# Patient Record
Sex: MALE | Race: WHITE | NOT HISPANIC OR LATINO
[De-identification: names, ages, dates, MRNs, and addresses within clinical notes are randomized per-mention and may not be internally consistent; named-entity substitution may affect disease eponyms.]

---

## 2018-11-26 ENCOUNTER — IMAGING SERVICES (OUTPATIENT)
Dept: OTHER | Age: 48
End: 2018-11-26

## 2018-11-26 ENCOUNTER — CHARTING TRANS (OUTPATIENT)
Dept: URGENT CARE | Age: 48
End: 2018-11-26

## 2018-11-26 ASSESSMENT — PAIN SCALES - GENERAL: PAINLEVEL_OUTOF10: 2

## 2019-01-10 ENCOUNTER — OFFICE VISIT (OUTPATIENT)
Dept: INTERNAL MEDICINE | Age: 49
End: 2019-01-10

## 2019-01-10 ENCOUNTER — APPOINTMENT (OUTPATIENT)
Dept: LAB | Age: 49
End: 2019-01-10

## 2019-01-10 VITALS
TEMPERATURE: 98.2 F | WEIGHT: 149 LBS | BODY MASS INDEX: 20.86 KG/M2 | SYSTOLIC BLOOD PRESSURE: 126 MMHG | HEIGHT: 71 IN | HEART RATE: 112 BPM | DIASTOLIC BLOOD PRESSURE: 82 MMHG

## 2019-01-10 DIAGNOSIS — R00.0 TACHYCARDIA: ICD-10-CM

## 2019-01-10 DIAGNOSIS — I44.4 LEFT ANTERIOR FASCICULAR HEMIBLOCK: ICD-10-CM

## 2019-01-10 DIAGNOSIS — R07.89 OTHER CHEST PAIN: Primary | ICD-10-CM

## 2019-01-10 PROCEDURE — 99203 OFFICE O/P NEW LOW 30 MIN: CPT | Performed by: INTERNAL MEDICINE

## 2019-01-22 ENCOUNTER — ANCILLARY PROCEDURE (OUTPATIENT)
Dept: GENERAL RADIOLOGY | Age: 49
End: 2019-01-22
Attending: INTERNAL MEDICINE

## 2019-01-22 ENCOUNTER — ANCILLARY PROCEDURE (OUTPATIENT)
Dept: CARDIOLOGY | Age: 49
End: 2019-01-22
Attending: INTERNAL MEDICINE

## 2019-01-22 DIAGNOSIS — I44.4 LEFT ANTERIOR FASCICULAR HEMIBLOCK: ICD-10-CM

## 2019-01-22 DIAGNOSIS — R07.89 OTHER CHEST PAIN: ICD-10-CM

## 2019-01-22 PROCEDURE — 78452 HT MUSCLE IMAGE SPECT MULT: CPT | Performed by: INTERNAL MEDICINE

## 2019-01-22 PROCEDURE — A9500 TC99M SESTAMIBI: HCPCS | Performed by: INTERNAL MEDICINE

## 2019-01-22 PROCEDURE — 93015 CV STRESS TEST SUPVJ I&R: CPT | Performed by: INTERNAL MEDICINE

## 2019-01-22 RX ORDER — TETRAKIS(2-METHOXYISOBUTYLISOCYANIDE)COPPER(I) TETRAFLUOROBORATE 1 MG/ML
24 INJECTION, POWDER, LYOPHILIZED, FOR SOLUTION INTRAVENOUS ONCE
Status: COMPLETED | OUTPATIENT
Start: 2019-01-22 | End: 2019-01-22

## 2019-01-22 RX ORDER — TETRAKIS(2-METHOXYISOBUTYLISOCYANIDE)COPPER(I) TETRAFLUOROBORATE 1 MG/ML
8 INJECTION, POWDER, LYOPHILIZED, FOR SOLUTION INTRAVENOUS ONCE
Status: COMPLETED | OUTPATIENT
Start: 2019-01-22 | End: 2019-01-22

## 2019-01-22 RX ADMIN — TETRAKIS(2-METHOXYISOBUTYLISOCYANIDE)COPPER(I) TETRAFLUOROBORATE 26.3 MILLICURIE: 1 INJECTION, POWDER, LYOPHILIZED, FOR SOLUTION INTRAVENOUS at 10:47

## 2019-01-22 RX ADMIN — TETRAKIS(2-METHOXYISOBUTYLISOCYANIDE)COPPER(I) TETRAFLUOROBORATE 8.8 MILLICURIE: 1 INJECTION, POWDER, LYOPHILIZED, FOR SOLUTION INTRAVENOUS at 09:15

## 2019-01-23 ENCOUNTER — TELEPHONE (OUTPATIENT)
Dept: CARDIOLOGY | Age: 49
End: 2019-01-23

## 2019-01-30 ENCOUNTER — TELEPHONE (OUTPATIENT)
Dept: INTERNAL MEDICINE | Age: 49
End: 2019-01-30

## 2019-02-07 ENCOUNTER — IMAGING SERVICES (OUTPATIENT)
Dept: GENERAL RADIOLOGY | Age: 49
End: 2019-02-07
Attending: INTERNAL MEDICINE

## 2019-02-07 ENCOUNTER — OFFICE VISIT (OUTPATIENT)
Dept: INTERNAL MEDICINE | Age: 49
End: 2019-02-07

## 2019-02-07 ENCOUNTER — LAB SERVICES (OUTPATIENT)
Dept: LAB | Age: 49
End: 2019-02-07

## 2019-02-07 VITALS
BODY MASS INDEX: 20.86 KG/M2 | SYSTOLIC BLOOD PRESSURE: 128 MMHG | HEART RATE: 118 BPM | WEIGHT: 149 LBS | HEIGHT: 71 IN | DIASTOLIC BLOOD PRESSURE: 84 MMHG | TEMPERATURE: 98.3 F

## 2019-02-07 DIAGNOSIS — N20.0 NEPHROLITHIASIS: ICD-10-CM

## 2019-02-07 DIAGNOSIS — R20.2 PARESTHESIA OF LEFT ARM: Primary | ICD-10-CM

## 2019-02-07 DIAGNOSIS — R20.2 PARESTHESIA OF LEFT ARM: ICD-10-CM

## 2019-02-07 DIAGNOSIS — R07.89 CHEST DISCOMFORT: ICD-10-CM

## 2019-02-07 DIAGNOSIS — R10.12 ABDOMINAL PAIN, LUQ (LEFT UPPER QUADRANT): ICD-10-CM

## 2019-02-07 LAB
BILIRUBIN URINE: NEGATIVE
BLOOD URINE: NEGATIVE
CLARITY: CLEAR
COLOR: YELLOW
GLUCOSE QUALITATIVE U: NEGATIVE
KETONES, URINE: 15
LEUKOCYTE ESTERASE URINE: NEGATIVE
NITRITE URINE: NEGATIVE
PH URINE: 6 (ref 5–7)
SPECIFIC GRAVITY URINE: 1.01 (ref 1–1.03)
URINE PROTEIN, QUAL (DIPSTICK): NEGATIVE
UROBILINOGEN URINE: 1

## 2019-02-07 PROCEDURE — 81003 URINALYSIS AUTO W/O SCOPE: CPT | Performed by: INTERNAL MEDICINE

## 2019-02-07 PROCEDURE — 99214 OFFICE O/P EST MOD 30 MIN: CPT | Performed by: INTERNAL MEDICINE

## 2019-02-07 PROCEDURE — 72050 X-RAY EXAM NECK SPINE 4/5VWS: CPT | Performed by: RADIOLOGY

## 2019-02-08 ENCOUNTER — TELEPHONE (OUTPATIENT)
Dept: INTERNAL MEDICINE | Age: 49
End: 2019-02-08

## 2019-02-08 DIAGNOSIS — R20.2 TINGLING SENSATION: Primary | ICD-10-CM

## 2019-02-11 ENCOUNTER — TELEPHONE (OUTPATIENT)
Dept: FAMILY MEDICINE | Age: 49
End: 2019-02-11

## 2019-03-06 VITALS
DIASTOLIC BLOOD PRESSURE: 100 MMHG | SYSTOLIC BLOOD PRESSURE: 150 MMHG | HEART RATE: 107 BPM | RESPIRATION RATE: 16 BRPM | OXYGEN SATURATION: 97 % | TEMPERATURE: 98.2 F

## 2021-08-23 ENCOUNTER — ANESTHESIA EVENT (OUTPATIENT)
Dept: SURGERY | Facility: HOSPITAL | Age: 51
DRG: 661 | End: 2021-08-23
Payer: COMMERCIAL

## 2021-08-23 ENCOUNTER — ANESTHESIA (OUTPATIENT)
Dept: SURGERY | Facility: HOSPITAL | Age: 51
DRG: 661 | End: 2021-08-23
Payer: COMMERCIAL

## 2021-08-23 ENCOUNTER — APPOINTMENT (OUTPATIENT)
Dept: CT IMAGING | Facility: HOSPITAL | Age: 51
DRG: 661 | End: 2021-08-23
Attending: PHYSICIAN ASSISTANT
Payer: COMMERCIAL

## 2021-08-23 PROBLEM — R73.9 HYPERGLYCEMIA: Status: ACTIVE | Noted: 2021-08-23

## 2021-08-23 PROBLEM — R00.0 TACHYCARDIA: Status: ACTIVE | Noted: 2021-08-23

## 2021-08-23 PROBLEM — D69.6 THROMBOCYTOPENIA (HCC): Status: ACTIVE | Noted: 2021-08-23

## 2021-08-23 PROBLEM — E87.6 HYPOKALEMIA: Status: ACTIVE | Noted: 2021-08-23

## 2021-08-23 PROBLEM — K70.31 ASCITES DUE TO ALCOHOLIC CIRRHOSIS (HCC): Status: ACTIVE | Noted: 2021-08-23

## 2021-08-23 PROBLEM — N20.1 URETERAL STONE: Status: ACTIVE | Noted: 2021-08-23

## 2021-08-23 PROCEDURE — 74177 CT ABD & PELVIS W/CONTRAST: CPT | Performed by: PHYSICIAN ASSISTANT

## 2021-08-23 RX ORDER — LIDOCAINE HYDROCHLORIDE 10 MG/ML
INJECTION, SOLUTION EPIDURAL; INFILTRATION; INTRACAUDAL; PERINEURAL AS NEEDED
Status: DISCONTINUED | OUTPATIENT
Start: 2021-08-23 | End: 2021-08-23 | Stop reason: SURG

## 2021-08-23 RX ORDER — NEOSTIGMINE METHYLSULFATE 1 MG/ML
INJECTION INTRAVENOUS AS NEEDED
Status: DISCONTINUED | OUTPATIENT
Start: 2021-08-23 | End: 2021-08-23 | Stop reason: SURG

## 2021-08-23 RX ORDER — ROCURONIUM BROMIDE 10 MG/ML
INJECTION, SOLUTION INTRAVENOUS AS NEEDED
Status: DISCONTINUED | OUTPATIENT
Start: 2021-08-23 | End: 2021-08-23 | Stop reason: SURG

## 2021-08-23 RX ORDER — DEXAMETHASONE SODIUM PHOSPHATE 4 MG/ML
VIAL (ML) INJECTION AS NEEDED
Status: DISCONTINUED | OUTPATIENT
Start: 2021-08-23 | End: 2021-08-23 | Stop reason: SURG

## 2021-08-23 RX ORDER — SODIUM CHLORIDE 9 MG/ML
INJECTION, SOLUTION INTRAVENOUS CONTINUOUS PRN
Status: DISCONTINUED | OUTPATIENT
Start: 2021-08-23 | End: 2021-08-23 | Stop reason: SURG

## 2021-08-23 RX ORDER — ONDANSETRON 2 MG/ML
INJECTION INTRAMUSCULAR; INTRAVENOUS AS NEEDED
Status: DISCONTINUED | OUTPATIENT
Start: 2021-08-23 | End: 2021-08-23 | Stop reason: SURG

## 2021-08-23 RX ORDER — GLYCOPYRROLATE 0.2 MG/ML
INJECTION, SOLUTION INTRAMUSCULAR; INTRAVENOUS AS NEEDED
Status: DISCONTINUED | OUTPATIENT
Start: 2021-08-23 | End: 2021-08-23 | Stop reason: SURG

## 2021-08-23 RX ADMIN — LIDOCAINE HYDROCHLORIDE 50 MG: 10 INJECTION, SOLUTION EPIDURAL; INFILTRATION; INTRACAUDAL; PERINEURAL at 21:57:00

## 2021-08-23 RX ADMIN — ROCURONIUM BROMIDE 40 MG: 10 INJECTION, SOLUTION INTRAVENOUS at 21:57:00

## 2021-08-23 RX ADMIN — GLYCOPYRROLATE 0.4 MG: 0.2 INJECTION, SOLUTION INTRAMUSCULAR; INTRAVENOUS at 22:26:00

## 2021-08-23 RX ADMIN — ONDANSETRON 4 MG: 2 INJECTION INTRAMUSCULAR; INTRAVENOUS at 22:26:00

## 2021-08-23 RX ADMIN — DEXAMETHASONE SODIUM PHOSPHATE 4 MG: 4 MG/ML VIAL (ML) INJECTION at 22:01:00

## 2021-08-23 RX ADMIN — NEOSTIGMINE METHYLSULFATE 3 MG: 1 INJECTION INTRAVENOUS at 22:26:00

## 2021-08-23 RX ADMIN — SODIUM CHLORIDE: 9 INJECTION, SOLUTION INTRAVENOUS at 21:52:00

## 2021-08-23 NOTE — ED QUICK NOTES
Patient alert and oriented x3. No respiratory distress noted. Skin pink, warm, and dry. Patient presents with hematuria for 3 days. Patient also reports right suprapubic pain 3 days ago that has since resolved. Denies fever, N/V/D.   Patient has Hx o

## 2021-08-23 NOTE — ED PROVIDER NOTES
Patient Seen in: [facility] Emergency Department      History   Patient presents with:  Abdomen/Flank Pain  Urinary Symptoms    Stated Complaint: hemoturia, abdomen pain, hx kidney stones    HPI/Subjective:   HPI    Pleasant 52-year-old gentleman. bilaterally  Cardio: Regular rate and rhythm, normal S1-S2, no murmur appreciable  Extremities: Equal +1 pitting edema bilaterally. Dorsal pedis pulses appreciable.   No jaundice  Skin: No sign of trauma, Skin warm and dry, no induration or sign of infecti DIFFERENTIAL[[id number]]          Abnormal            Final result                 Please view results for these tests on the individual orders.    LACTIC ACID, PLASMA   RAINBOW DRAW LAVENDER   RAINBOW DRAW LIGHT GREEN   RAINBOW DRAW GOLD   RAINBOW DRAW BLUE including the appendix. Small bowel wall thickening. Moderate to large amount of ascites. Within the left upper quadrant peripherally is mesenteric infiltration and nodularity, peritoneal carcinomatosis cannot be excluded.   ABDOMINAL WALL:  No mass or h elevation of AST with a level of 80. Alkaline phosphatase of 229. Potassium of 3.3. Lipase within normal limits    PT of 17.2. INR of 1.37    CONCLUSION:  1. Right distal ureteral 4 mm obstructing calcification as detailed above.  2. Nonobstructing left

## 2021-08-24 NOTE — BH PROGRESS NOTE
Samara Hatch  seen the pt for his etoh history and possible cd options for treatment. Catia Pulido stated the pt appears to be in denial about the severity of his drinking. He denies help and states he has learned his lesson.   Patient was bijal

## 2021-08-24 NOTE — ANESTHESIA PROCEDURE NOTES
Airway  Date/Time: 8/23/2021 9:57 PM  Urgency: elective      General Information and Staff    Patient location during procedure: OR  Anesthesiologist: Tavon Hamilton MD  Performed: anesthesiologist     Indications and Patient Condition  Indications for

## 2021-08-24 NOTE — PLAN OF CARE
Assumed care at 0730. Pt a/ox4, VSS, on RA. No c/o pain, n/v/d, SOB, dizziness/lightheadedness. Hematuria noted post procedure. Rocephin q24h. Tolerating diet. IVF running 0.9NS at 100mL/hr. Pt updated on POC. Will continue to monitor.        Problem: Fior

## 2021-08-24 NOTE — OPERATIVE REPORT
Wm 236 Patient Status:  Emergency    1970 MRN [id number]   Location [facility] Attending Marshall Anderson MD   Hosp Day # 0 PCP No primary care provider on file.         DATE OF inside the ureteral orifice. The stent was in good position when the guidewire was removed. The patient was awakened, and taken to the recovery area in good condition. Jacqueline Rider M.D.  8/23/2021  10:34 PM

## 2021-08-24 NOTE — PROGRESS NOTES
Pt doing well, very mild tremors  Is reconsidering staying for GI eval and etoh w/d  Has never gone through w/d before but has not gone more than a day w/o etoh in 25 years. Has some mild tremors nows  Will consult GI.  Consider paracentesis  Start CIWA pr

## 2021-08-24 NOTE — PROGRESS NOTES
[facility]  Urology Progress Note    Janette Murray Patient Status:  Inpatient    1970 MRN [id number]   [facility] 3NE-A Attending Carmen Beatty MD   Hosp Day # 1 PCP No primary care provider on file.      Subjective:  Janette Murray with DC today from urology standpoint. Above discussed with patient, nurse.     Devin Snow, [facility] Urology  8/24/2021  9:09 AM

## 2021-08-24 NOTE — CONSULTS
[facility]                       Gastroenterology [address] [facility] Gastroenterology    Victor Hugo Bang Patient Status:  Inpatient    1970 MRN [id number]   [facility] 3NE-A Attending Antonio Camarena MD   [facility] Day # 1 PCP No primar (COMPAZINE) injection 5 mg, 5 mg, Intravenous, Q8H PRN  [COMPLETED] potassium chloride (K-DUR M20) CR tab 40 mEq, 40 mEq, Oral, Once  LORazepam (ATIVAN) tab 1 mg, 1 mg, Oral, Q1H PRN   Or  LORazepam (ATIVAN) injection 1 mg, 1 mg, Intravenous, Q1H PRN   Or palpitations            Respiratory: No shortness of breath, asthma, copd, recurrent pneumonia            Hematologic: The patient reports no easy bruising, frequent gum bleeding or nose bleeding;   The patient has no history of known chronic anemia 08/23/2021    ALKPHO 229 08/23/2021    BILT 1.9 08/23/2021    AST 80 08/23/2021    ALT 23 08/23/2021    INR 1.37 08/23/2021    PTP 17.2 08/23/2021     08/23/2021     Recent Labs   Lab 08/23/21  1743 08/24/21  0634   *  --    BUN 7  --    CREA calcification with a smaller punctate calcification more superiorly.     ADRENALS:  No mass or enlargement.     AORTA/VASCULAR:  No aneurysm or dissection.     RETROPERITONEUM:  No mass or adenopathy.     BOWEL/MESENTERY:  Small hiatal hernia.  Normal shayla 2.7, INR 1.37, WBC 5.9, Hgb 14, plt 120. Will start cirrhosis work-up to r/o autoimmune, viral process vs EtOH, obtain diagnostic/therapeutic paracentesis, and start low dose diuretics.  Discussed need for EtOH abstinence and need for further outpatient wor spironolactone and lasix, low Na diet, etoh abstinence and substance abuse counseling, outpatient EGD, colonoscopy at age 49, liver doppler    Casey De Los Santos MD MD  [facility] Gastroenterology  8/24/2021  4:47 PM

## 2021-08-24 NOTE — ANESTHESIA PREPROCEDURE EVALUATION
PRE-OP EVALUATION    Patient Name: Purvi Gave    Admit Diagnosis: Ureteral stone [N20.1]  Tachycardia [R00.0]  Ascites due to alcoholic cirrhosis ([address] [L40.18]    Pre-op Diagnosis: Ureteral calculus, right [N20.1]    CYSTOSCOPY STENT INSERTION RIGHT 35.8 (H) 08/23/2021    MCHC 34.9 08/23/2021    RDW 14.7 08/23/2021    .0 (L) 08/23/2021     Lab Results   Component Value Date     08/23/2021    K 3.3 (L) 08/23/2021     08/23/2021    CO2 25.0 08/23/2021    BUN 7 08/23/2021    CREATSERUM

## 2021-08-24 NOTE — CONSULTS
[facility]  Report of Consultation    Tiffany Deluna Patient Status:  Emergency    1970 MRN [id number]   [facility] SURGERY Attending Dennis Gaines MD   Hosp Day # 0 PCP No primary care provider on file.      Impression and Plan: 99.1 °F (37.3 °C) (Temporal)   Resp 24   Wt 157 lb (71.2 kg)   SpO2 95%   General: Alert, orientated x3. Cooperative. No apparent distress. HEENT: Exam is unremarkable. Lungs: Clear to auscultation bilaterally. Cardiac: Regular rate and rhythm.  No mu

## 2021-08-24 NOTE — H&P
[facility] HOSPITAL[facility]  History and Physical     Corby Manzano Patient Status:  Inpatient    1970 MRN [id number]   [facility] 3NE-A Attending Gibson Skinner, [address] [facility]note 3[facility] Day # 1 PCP No primary care provider on file.      Chief Complaint: Carla Sosa +distension. Positive bowel sounds. No rebound, guarding or organomegaly. Neurologic: No focal neurological deficits. CNII-XII grossly intact. Musculoskeletal: Moves all extremities. Extremities: No edema or cyanosis. Integument: No rashes or lesions. MD  8/24/2021

## 2021-08-24 NOTE — PROGRESS NOTES
08/24/21 1032   Clinical Encounter Type   Visited With Health care provider   Patient's Supportive Strategies/Resources Patient finds community/spiritual support at [address] [facility]   Referral To Nurse  (Father Tate Hough from [facility]

## 2021-08-24 NOTE — CM/SW NOTE
MDO: For PCP  TERRA placed name of call call [facility] PCP on AVS.

## 2021-08-24 NOTE — PLAN OF CARE
PT RECEIVED FROM PACU A/O, 92% ON RA, AFEBRILE, DENIES PAIN, SCDS ON, VOIDING BLOODY URINE, STRAINED, REPLACED K PER PROTOCOL, IVF INFUSING, GIVEN SCHEDULED IBUPROFEN, LABS IN AM, INSTRUCTED PT ON POC    Problem: GENITOURINARY - ADULT  Goal: Absence of uri

## 2021-08-24 NOTE — ANESTHESIA POSTPROCEDURE EVALUATION
[address] [facility] Patient Status:  Emergency   Age/Gender 53year old male MRN [id number]   Location [address] [facility] Attending Janet Lee MD   Hosp Day # 0 PCP No primary care provider on file.        Anesthesia Po

## 2021-08-24 NOTE — IMAGING NOTE
Plan for US paracentesis 8/25/21 ay 9:30am  Instructions reviewed with Asif Fuller RN  Patient is Consent able  Hold lovenox in am 8/25 until after the procedure  Labs reviewed; no additional labs needed at this time  Clear liquids in am  Call radiology nursing

## 2021-08-25 ENCOUNTER — APPOINTMENT (OUTPATIENT)
Dept: ULTRASOUND IMAGING | Facility: HOSPITAL | Age: 51
DRG: 661 | End: 2021-08-25
Attending: NURSE PRACTITIONER
Payer: COMMERCIAL

## 2021-08-25 PROCEDURE — 93975 VASCULAR STUDY: CPT | Performed by: NURSE PRACTITIONER

## 2021-08-25 PROCEDURE — 49083 ABD PARACENTESIS W/IMAGING: CPT | Performed by: NURSE PRACTITIONER

## 2021-08-25 PROCEDURE — 76700 US EXAM ABDOM COMPLETE: CPT | Performed by: NURSE PRACTITIONER

## 2021-08-25 NOTE — PLAN OF CARE
NURSING DISCHARGE NOTE    Discharged Home via Ambulatory. Accompanied by Family member  Belongings Taken by patient/family. Patient was discharged home with new medications.  Discussed discharge instructions discussed with patient and father at beds

## 2021-08-25 NOTE — PLAN OF CARE
Pt A&OX4 Room Air  Resting in bed  Denies pain at this time  Hematuria Noted  Continue to strain Urine  Electrolyte protocol  CIWA protocol, pt Not actively detoxing  at this time  Seizure precaution maintained.   Q24 Rocephin   Plan paracentesis and ABD US

## 2021-08-25 NOTE — PROCEDURES
[facility]  Procedure Note    Tammideanna Smithyoko Patient Status:  Inpatient    1970 MRN [id number]   [facility] 3NE-A Attending Mariana Bermudez MD   Hosp Day # 2 PCP No primary care provider on file.      LOCATION: Right lower quadrant  F

## 2021-08-25 NOTE — PROGRESS NOTES
[facility]  Progress Note    Janette Murray Patient Status:  Inpatient    1970 MRN [id number]   [facility] 3NE-A Attending Carmen Beatty MD   Date 2021 PCP No primary care provider on file.      Subjective:  Janette Murray is colic      Impression:  Liver cirrhosis  Ascites high SAAG, no SBP  LE edema    Plan:  Low NA diet  Complete hepatitis w/u  Spironolactone 50 mg PO q day  Lasix 20 mg PO q day  Ok to discharge from GI stand point  Elective EGD as outpatient, follow GI clin

## 2021-08-25 NOTE — PLAN OF CARE
Problem: GENITOURINARY - ADULT  Goal: Absence of urinary retention  Description: INTERVENTIONS:  - Assess patient’s ability to void and empty bladder  - Monitor intake/output and perform bladder scan as needed  - Follow urinary retention protocol/allan

## 2021-08-26 ENCOUNTER — PATIENT OUTREACH (OUTPATIENT)
Dept: CASE MANAGEMENT | Age: 51
End: 2021-08-26

## 2021-08-26 DIAGNOSIS — Z02.9 ENCOUNTERS FOR UNSPECIFIED ADMINISTRATIVE PURPOSE: ICD-10-CM

## 2021-08-26 NOTE — DISCHARGE SUMMARY
[facility] HOSPITALIST  DISCHARGE SUMMARY     Juan Francisco Bowser Patient Status:  Inpatient    1970 MRN [id number]   [facility] 3NE-A Attending No att. providers found   [facility] Day # 2 PCP No primary care provider on file.      Date of Admission: 8 paracentesis    Incidental or significant findings and recommendations (brief descriptions):  • none    Lab/Test results pending at Discharge:   · none    Consultants:  • Urology, GI     Discharge Medication List:     Discharge Medications      START takin General: No acute distress. Respiratory: Clear to auscultation bilaterally. No wheezes. No rhonchi. Cardiovascular: S1, S2. Regular rate and rhythm. No murmurs, rubs or gallops. Abdomen: Soft, nontender, distended. Positive bowel sounds.  No rebound

## 2021-08-31 ENCOUNTER — PATIENT OUTREACH (OUTPATIENT)
Dept: CASE MANAGEMENT | Age: 51
End: 2021-08-31

## 2021-08-31 NOTE — PROGRESS NOTES
Pt calling to ask for assistance scheduling the following:     Follow up with Leila Hernandez MD  liver cirrhosis  [facility] [phone]  Office will not schedule without referral     Schedule an appointment wi

## 2021-09-03 ENCOUNTER — OFFICE VISIT (OUTPATIENT)
Dept: FAMILY MEDICINE CLINIC | Facility: CLINIC | Age: 51
End: 2021-09-03

## 2021-09-03 VITALS
HEIGHT: 69.69 IN | DIASTOLIC BLOOD PRESSURE: 60 MMHG | WEIGHT: 138.19 LBS | OXYGEN SATURATION: 99 % | TEMPERATURE: 98 F | SYSTOLIC BLOOD PRESSURE: 110 MMHG | HEART RATE: 91 BPM | RESPIRATION RATE: 18 BRPM | BODY MASS INDEX: 20.01 KG/M2

## 2021-09-03 DIAGNOSIS — R31.0 GROSS HEMATURIA: ICD-10-CM

## 2021-09-03 DIAGNOSIS — K70.31 ASCITES DUE TO ALCOHOLIC CIRRHOSIS (HCC): ICD-10-CM

## 2021-09-03 DIAGNOSIS — N20.1 URETERAL STONE: Primary | ICD-10-CM

## 2021-09-03 PROBLEM — F17.209 TOBACCO USE DISORDER, CONTINUOUS: Status: ACTIVE | Noted: 2021-09-03

## 2021-09-03 PROCEDURE — 3074F SYST BP LT 130 MM HG: CPT | Performed by: FAMILY MEDICINE

## 2021-09-03 PROCEDURE — 99203 OFFICE O/P NEW LOW 30 MIN: CPT | Performed by: FAMILY MEDICINE

## 2021-09-03 PROCEDURE — 3078F DIAST BP <80 MM HG: CPT | Performed by: FAMILY MEDICINE

## 2021-09-03 PROCEDURE — 3008F BODY MASS INDEX DOCD: CPT | Performed by: FAMILY MEDICINE

## 2021-09-03 RX ORDER — CALCIUM CARBONATE/VITAMIN D3 600 MG-10
1 TABLET ORAL DAILY
COMMUNITY
Start: 2021-08-25 | End: 2021-09-15 | Stop reason: ALTCHOICE

## 2021-09-04 PROBLEM — R00.0 TACHYCARDIA: Status: RESOLVED | Noted: 2021-08-23 | Resolved: 2021-09-04

## 2021-09-04 PROBLEM — R73.9 HYPERGLYCEMIA: Status: RESOLVED | Noted: 2021-08-23 | Resolved: 2021-09-04

## 2021-09-05 NOTE — PROGRESS NOTES
CHIEF COMPLAINT: Patient presents with:  Hospital F/U  Establish Care        HPI:     Harsh Mitchell is a 53year old male presents for hospital f-u. Maycol Iraj is a 52 yo recovering alcoholic with liver cirrhosis here to establish care p/w hospital f-u. Medication Sig Dispense Refill   • thiamine 100 MG Oral Tab Take 1 tablet (100 mg total) by mouth daily. 30 tablet 0   • furosemide 20 MG Oral Tab Take 1 tablet (20 mg total) by mouth daily.  30 tablet 0   • spironolactone 50 MG Oral Tab Take 1 tablet (50 Palpations: Abdomen is soft. There is no mass. Tenderness: There is no guarding. Musculoskeletal:      Cervical back: Normal range of motion and neck supple. Lymphadenopathy:      Cervical: No cervical adenopathy.    Neurological:      Mental Statu • Eosinophil Absolute 08/23/2021 0.08    • Basophil Absolute 08/23/2021 0.03    • Immature Granulocyte Abs* 08/23/2021 0.01    • Neutrophil % 08/23/2021 61.3    • Lymphocyte % 08/23/2021 26.2    • Monocyte % 08/23/2021 10.4    • Eosinophil % 08/23/2021 1 • Hepatitis B Surface Anti* 08/24/2021 Nonreactive     • Heptatitis B Surface Ab * 08/24/2021 <3.10    • Hepatitis A Virus Ab, To* 08/23/2021 Reactive *   • Hepatitis C Virus 08/23/2021 Nonreactive     • Glucose 08/24/2021 120*   • Sodium 08/24/2021 135* Fluid Color 08/25/2021 Yellow    • Body Fluid Clarity 08/25/2021 Clear    • WBC Peritoneal 08/25/2021 117    • RBC Peritoneal 08/25/2021 <3,000    • Body Fluid Culture Result 08/25/2021 No Growth 5 Days    • Body Fld Smear 08/25/2021 1+ Neutrophils seen 08/25/2021 2.0    • Neutrophils Peritoneal 08/25/2021 9    • Lymphocyte Peritoneal 08/25/2021 31    • Mono/Mac/Histio Peritone* 08/25/2021 57    • Eosinophils Peritoneal 08/25/2021 0    • Basophil Peritoneal 08/25/2021 0    • Mesothelial Peritoneal 08/25/2 cirrhosis ([address]     Ureteral stone     Pyelonephritis     Tobacco use disorder, continuous      Imaging & Referrals:  GASTRO - INTERNAL  UROLOGY - INTERNAL     9/4/2021  Audra Moore MD      Patient understands plan and follow-up.   Return in abo

## 2021-09-09 NOTE — PROGRESS NOTES
Several attempts made to reach the patient with no return call. Patient completed HFU on 9/3/2021. Closing encounter.

## 2021-09-18 ENCOUNTER — LAB ENCOUNTER (OUTPATIENT)
Dept: LAB | Facility: HOSPITAL | Age: 51
End: 2021-09-18
Attending: UROLOGY
Payer: COMMERCIAL

## 2021-09-18 DIAGNOSIS — N20.1 URETERAL CALCULUS: ICD-10-CM

## 2021-09-18 LAB
ALBUMIN SERPL-MCNC: 2.6 G/DL (ref 3.4–5)
ALBUMIN/GLOB SERPL: 0.5 {RATIO} (ref 1–2)
ALP LIVER SERPL-CCNC: 185 U/L
ALT SERPL-CCNC: 54 U/L
ANION GAP SERPL CALC-SCNC: 6 MMOL/L (ref 0–18)
AST SERPL-CCNC: 55 U/L (ref 15–37)
BILIRUB SERPL-MCNC: 1.4 MG/DL (ref 0.1–2)
BUN BLD-MCNC: 17 MG/DL (ref 7–18)
CALCIUM BLD-MCNC: 9.2 MG/DL (ref 8.5–10.1)
CHLORIDE SERPL-SCNC: 105 MMOL/L (ref 98–112)
CO2 SERPL-SCNC: 22 MMOL/L (ref 21–32)
CREAT BLD-MCNC: 0.84 MG/DL
GLOBULIN PLAS-MCNC: 5.2 G/DL (ref 2.8–4.4)
GLUCOSE BLD-MCNC: 96 MG/DL (ref 70–99)
INR BLD: 1.17 (ref 0.89–1.11)
OSMOLALITY SERPL CALC.SUM OF ELEC: 277 MOSM/KG (ref 275–295)
POTASSIUM SERPL-SCNC: 4.7 MMOL/L (ref 3.5–5.1)
PROT SERPL-MCNC: 7.8 G/DL (ref 6.4–8.2)
PROTHROMBIN TIME: 15.2 SECONDS (ref 12.2–14.5)
SODIUM SERPL-SCNC: 133 MMOL/L (ref 136–145)

## 2021-09-18 PROCEDURE — 80053 COMPREHEN METABOLIC PANEL: CPT

## 2021-09-18 PROCEDURE — 36415 COLL VENOUS BLD VENIPUNCTURE: CPT

## 2021-09-18 PROCEDURE — 85610 PROTHROMBIN TIME: CPT

## 2021-09-20 ENCOUNTER — ANESTHESIA EVENT (OUTPATIENT)
Dept: SURGERY | Facility: HOSPITAL | Age: 51
End: 2021-09-20
Payer: COMMERCIAL

## 2021-09-20 ENCOUNTER — APPOINTMENT (OUTPATIENT)
Dept: GENERAL RADIOLOGY | Facility: HOSPITAL | Age: 51
End: 2021-09-20
Attending: UROLOGY
Payer: COMMERCIAL

## 2021-09-20 ENCOUNTER — ANESTHESIA (OUTPATIENT)
Dept: SURGERY | Facility: HOSPITAL | Age: 51
End: 2021-09-20
Payer: COMMERCIAL

## 2021-09-20 ENCOUNTER — HOSPITAL ENCOUNTER (OUTPATIENT)
Facility: HOSPITAL | Age: 51
Setting detail: HOSPITAL OUTPATIENT SURGERY
Discharge: HOME | End: 2021-09-20
Attending: UROLOGY | Admitting: UROLOGY
Payer: COMMERCIAL

## 2021-09-20 VITALS
SYSTOLIC BLOOD PRESSURE: 115 MMHG | OXYGEN SATURATION: 100 % | RESPIRATION RATE: 16 BRPM | DIASTOLIC BLOOD PRESSURE: 70 MMHG | HEART RATE: 89 BPM | TEMPERATURE: 98 F | WEIGHT: 140.19 LBS | BODY MASS INDEX: 20.3 KG/M2 | HEIGHT: 69.6 IN

## 2021-09-20 DIAGNOSIS — N20.1 URETERAL CALCULUS: Primary | ICD-10-CM

## 2021-09-20 LAB — SARS-COV-2 RNA RESP QL NAA+PROBE: NOT DETECTED

## 2021-09-20 PROCEDURE — 0TC68ZZ EXTIRPATION OF MATTER FROM RIGHT URETER, VIA NATURAL OR ARTIFICIAL OPENING ENDOSCOPIC: ICD-10-PCS | Performed by: UROLOGY

## 2021-09-20 PROCEDURE — 88300 SURGICAL PATH GROSS: CPT | Performed by: UROLOGY

## 2021-09-20 PROCEDURE — 0TP98DZ REMOVAL OF INTRALUMINAL DEVICE FROM URETER, VIA NATURAL OR ARTIFICIAL OPENING ENDOSCOPIC: ICD-10-PCS | Performed by: UROLOGY

## 2021-09-20 PROCEDURE — 82365 CALCULUS SPECTROSCOPY: CPT | Performed by: UROLOGY

## 2021-09-20 RX ORDER — HYDROCODONE BITARTRATE AND ACETAMINOPHEN 5; 325 MG/1; MG/1
2 TABLET ORAL AS NEEDED
Status: DISCONTINUED | OUTPATIENT
Start: 2021-09-20 | End: 2021-09-20

## 2021-09-20 RX ORDER — SODIUM CHLORIDE, SODIUM LACTATE, POTASSIUM CHLORIDE, CALCIUM CHLORIDE 600; 310; 30; 20 MG/100ML; MG/100ML; MG/100ML; MG/100ML
INJECTION, SOLUTION INTRAVENOUS CONTINUOUS
Status: DISCONTINUED | OUTPATIENT
Start: 2021-09-20 | End: 2021-09-20

## 2021-09-20 RX ORDER — METOCLOPRAMIDE HYDROCHLORIDE 5 MG/ML
10 INJECTION INTRAMUSCULAR; INTRAVENOUS AS NEEDED
Status: DISCONTINUED | OUTPATIENT
Start: 2021-09-20 | End: 2021-09-20

## 2021-09-20 RX ORDER — NALOXONE HYDROCHLORIDE 0.4 MG/ML
80 INJECTION, SOLUTION INTRAMUSCULAR; INTRAVENOUS; SUBCUTANEOUS AS NEEDED
Status: DISCONTINUED | OUTPATIENT
Start: 2021-09-20 | End: 2021-09-20

## 2021-09-20 RX ORDER — HYDROMORPHONE HYDROCHLORIDE 1 MG/ML
0.4 INJECTION, SOLUTION INTRAMUSCULAR; INTRAVENOUS; SUBCUTANEOUS EVERY 5 MIN PRN
Status: DISCONTINUED | OUTPATIENT
Start: 2021-09-20 | End: 2021-09-20

## 2021-09-20 RX ORDER — CEFAZOLIN SODIUM/WATER 2 G/20 ML
2 SYRINGE (ML) INTRAVENOUS ONCE
Status: COMPLETED | OUTPATIENT
Start: 2021-09-20 | End: 2021-09-20

## 2021-09-20 RX ORDER — CEPHALEXIN 500 MG/1
500 CAPSULE ORAL 3 TIMES DAILY
Qty: 6 CAPSULE | Refills: 0 | Status: SHIPPED | OUTPATIENT
Start: 2021-09-20 | End: 2021-09-22

## 2021-09-20 RX ORDER — LIDOCAINE HYDROCHLORIDE 10 MG/ML
INJECTION, SOLUTION EPIDURAL; INFILTRATION; INTRACAUDAL; PERINEURAL AS NEEDED
Status: DISCONTINUED | OUTPATIENT
Start: 2021-09-20 | End: 2021-09-20 | Stop reason: SURG

## 2021-09-20 RX ORDER — ONDANSETRON 2 MG/ML
4 INJECTION INTRAMUSCULAR; INTRAVENOUS AS NEEDED
Status: DISCONTINUED | OUTPATIENT
Start: 2021-09-20 | End: 2021-09-20

## 2021-09-20 RX ORDER — METOCLOPRAMIDE HYDROCHLORIDE 5 MG/ML
INJECTION INTRAMUSCULAR; INTRAVENOUS AS NEEDED
Status: DISCONTINUED | OUTPATIENT
Start: 2021-09-20 | End: 2021-09-20 | Stop reason: SURG

## 2021-09-20 RX ORDER — DEXAMETHASONE SODIUM PHOSPHATE 4 MG/ML
VIAL (ML) INJECTION AS NEEDED
Status: DISCONTINUED | OUTPATIENT
Start: 2021-09-20 | End: 2021-09-20 | Stop reason: SURG

## 2021-09-20 RX ORDER — HYDROCODONE BITARTRATE AND ACETAMINOPHEN 5; 325 MG/1; MG/1
1 TABLET ORAL AS NEEDED
Status: DISCONTINUED | OUTPATIENT
Start: 2021-09-20 | End: 2021-09-20

## 2021-09-20 RX ORDER — PHENYLEPHRINE HCL 10 MG/ML
VIAL (ML) INJECTION AS NEEDED
Status: DISCONTINUED | OUTPATIENT
Start: 2021-09-20 | End: 2021-09-20 | Stop reason: SURG

## 2021-09-20 RX ADMIN — PHENYLEPHRINE HCL 25 MCG: 10 MG/ML VIAL (ML) INJECTION at 15:19:00

## 2021-09-20 RX ADMIN — LIDOCAINE HYDROCHLORIDE 50 MG: 10 INJECTION, SOLUTION EPIDURAL; INFILTRATION; INTRACAUDAL; PERINEURAL at 15:16:00

## 2021-09-20 RX ADMIN — SODIUM CHLORIDE, SODIUM LACTATE, POTASSIUM CHLORIDE, CALCIUM CHLORIDE: 600; 310; 30; 20 INJECTION, SOLUTION INTRAVENOUS at 16:03:00

## 2021-09-20 RX ADMIN — CEFAZOLIN SODIUM/WATER 2 G: 2 G/20 ML SYRINGE (ML) INTRAVENOUS at 15:23:00

## 2021-09-20 RX ADMIN — METOCLOPRAMIDE HYDROCHLORIDE 10 MG: 5 INJECTION INTRAMUSCULAR; INTRAVENOUS at 15:29:00

## 2021-09-20 RX ADMIN — DEXAMETHASONE SODIUM PHOSPHATE 4 MG: 4 MG/ML VIAL (ML) INJECTION at 15:29:00

## 2021-09-20 NOTE — H&P
[facility]  H&P    Isatudorothy Vivas Patient Status:  Hospital Outpatient Surgery    1970 MRN [id number]   [facility] SURGERY Attending Stella Hassan MD   [facility] Day # 0 PCP Patricio Meigs     Impression and Plan:  Right Never used    Substance and Sexual Activity      Alcohol use: Not Currently        Comment: 8-10 shots approx/day      Drug use: Never      Sexual activity: Not on file    Other Topics      Concerns:        Caffeine Concern: Not Asked        Exercise: Not (63.6 kg)   SpO2 97%   BMI 20.35 kg/m²   General: Alert, orientated x3. Cooperative. No apparent distress. HEENT: Exam is unremarkable. Lungs: Clear to auscultation bilaterally. Cardiac: Regular rate and rhythm. No murmur.   Abdomen:  Soft, non-disten

## 2021-09-20 NOTE — ANESTHESIA POSTPROCEDURE EVALUATION
[address] Patient Status:  Hospital Outpatient Surgery   Age/Gender 49year old male MRN [id number]   Location [address] [facility] Attending Lalita Perez MD   [facility] Day # 0 PCP Cedric Garcia

## 2021-09-20 NOTE — OPERATIVE REPORT
[facility] Patient Status:  Hospital Outpatient Surgery    1970 MRN [id number]   [facility] SURGERY Attending Carissa Barreto MD   [facility] Day # 0 PCP Audra [place]       DATE OF OPE to the recovery area in good condition.      Rikki Turpin MD  9/20/2021  3:56 PM

## 2021-09-20 NOTE — ANESTHESIA PROCEDURE NOTES
Airway  Date/Time: 9/20/2021 3:19 PM  Urgency: elective      General Information and Staff    Patient location during procedure: OR  Anesthesiologist: Nola Alfaro DO  Performed: anesthesiologist     Indications and Patient Condition  Indications fo

## 2021-09-20 NOTE — ANESTHESIA PREPROCEDURE EVALUATION
PRE-OP EVALUATION    Patient Name: Enoch Pearson    Admit Diagnosis: Ureteral calculus [N20.1]    Pre-op Diagnosis: Ureteral calculus [N20.1]    CYSTOSCOPY, RIGHT URETEROSCOPIC STONE EXTRACTION, POSSIBLE HOLMIUM LASER LITHOTRIPSY, RIGHT RETROGRADE PY Past Surgical History:   Procedure Laterality Date   • OTHER  08/2021    kidney stent placement   • OTHER Right     broken leg repair-no hardware     Social History    Tobacco Use      Smoking status: Light Tobacco Smoker      Smokeless tobacco

## 2021-09-24 LAB
CALCULI MASS: 11 MG
CALCULI NUMBER: 4

## 2021-10-01 ENCOUNTER — LAB ENCOUNTER (OUTPATIENT)
Dept: LAB | Age: 51
End: 2021-10-01
Attending: INTERNAL MEDICINE
Payer: COMMERCIAL

## 2021-10-01 DIAGNOSIS — K70.31 ALCOHOLIC CIRRHOSIS OF LIVER WITH ASCITES (HCC): ICD-10-CM

## 2021-10-01 PROCEDURE — 80053 COMPREHEN METABOLIC PANEL: CPT

## 2021-10-01 PROCEDURE — 36415 COLL VENOUS BLD VENIPUNCTURE: CPT

## 2021-10-04 PROBLEM — Z12.11 SPECIAL SCREENING FOR MALIGNANT NEOPLASMS, COLON: Status: ACTIVE | Noted: 2021-10-04

## 2022-05-17 ENCOUNTER — PATIENT OUTREACH (OUTPATIENT)
Dept: FAMILY MEDICINE CLINIC | Facility: CLINIC | Age: 52
End: 2022-05-17

## 2022-09-26 ENCOUNTER — HOSPITAL ENCOUNTER (OUTPATIENT)
Dept: CT IMAGING | Age: 52
Discharge: HOME | End: 2022-09-26
Attending: FAMILY MEDICINE

## 2022-09-26 ENCOUNTER — OFFICE VISIT (OUTPATIENT)
Dept: FAMILY MEDICINE CLINIC | Facility: CLINIC | Age: 52
End: 2022-09-26

## 2022-09-26 VITALS
SYSTOLIC BLOOD PRESSURE: 126 MMHG | DIASTOLIC BLOOD PRESSURE: 50 MMHG | TEMPERATURE: 99 F | BODY MASS INDEX: 21 KG/M2 | HEART RATE: 84 BPM | WEIGHT: 153.63 LBS | OXYGEN SATURATION: 97 % | RESPIRATION RATE: 18 BRPM

## 2022-09-26 DIAGNOSIS — F17.200 TOBACCO DEPENDENCE: ICD-10-CM

## 2022-09-26 DIAGNOSIS — R31.0 GROSS HEMATURIA: ICD-10-CM

## 2022-09-26 DIAGNOSIS — N20.0 NEPHROLITHIASIS: Primary | ICD-10-CM

## 2022-09-26 DIAGNOSIS — R31.0 GROSS HEMATURIA: Primary | ICD-10-CM

## 2022-09-26 PROBLEM — E87.6 HYPOKALEMIA: Status: RESOLVED | Noted: 2021-08-23 | Resolved: 2022-09-26

## 2022-09-26 PROBLEM — Z12.11 SPECIAL SCREENING FOR MALIGNANT NEOPLASMS, COLON: Status: RESOLVED | Noted: 2021-10-04 | Resolved: 2022-09-26

## 2022-09-26 LAB
APPEARANCE: CLEAR
GLUCOSE (URINE DIPSTICK): NEGATIVE MG/DL
LEUKOCYTES: NEGATIVE
MULTISTIX LOT#: ABNORMAL NUMERIC
NITRITE, URINE: NEGATIVE
PH, URINE: 6 (ref 4.5–8)
PROTEIN (URINE DIPSTICK): 100 MG/DL
SPECIFIC GRAVITY: 1.02 (ref 1–1.03)
UROBILINOGEN,SEMI-QN: 2 MG/DL (ref 0–1.9)

## 2022-09-26 PROCEDURE — 87086 URINE CULTURE/COLONY COUNT: CPT | Performed by: FAMILY MEDICINE

## 2022-09-26 PROCEDURE — 74176 CT ABD & PELVIS W/O CONTRAST: CPT | Performed by: FAMILY MEDICINE

## 2022-09-29 ENCOUNTER — PATIENT MESSAGE (OUTPATIENT)
Dept: FAMILY MEDICINE CLINIC | Facility: CLINIC | Age: 52
End: 2022-09-29

## 2022-09-30 NOTE — TELEPHONE ENCOUNTER
From: Geanie Osgood  To: Margi Escobar MD  Sent: 9/29/2022 11:44 PM CDT  Subject: Question regarding Urinalysis w/o scope    Hi, I don't see an referral for Dr. Alexandra Glez the urologist I saw last time. Are you able to set one up or is he not covered under the new insurance?

## 2022-10-27 ENCOUNTER — OFFICE VISIT (OUTPATIENT)
Dept: SURGERY | Facility: CLINIC | Age: 52
End: 2022-10-27
Payer: COMMERCIAL

## 2022-10-27 DIAGNOSIS — N20.0 NEPHROLITHIASIS: Primary | ICD-10-CM

## 2022-10-27 DIAGNOSIS — R82.90 URINE FINDING: ICD-10-CM

## 2022-10-27 LAB
GLUCOSE (URINE DIPSTICK): NEGATIVE MG/DL
KETONES (URINE DIPSTICK): NEGATIVE MG/DL
LEUKOCYTES: NEGATIVE
MULTISTIX LOT#: ABNORMAL NUMERIC
NITRITE, URINE: NEGATIVE
PH, URINE: 5.5 (ref 4.5–8)
PROTEIN (URINE DIPSTICK): 30 MG/DL
SPECIFIC GRAVITY: >=1.03 (ref 1–1.03)
UROBILINOGEN,SEMI-QN: 1 MG/DL (ref 0–1.9)

## 2022-10-27 PROCEDURE — 99204 OFFICE O/P NEW MOD 45 MIN: CPT | Performed by: SURGERY

## 2022-10-27 PROCEDURE — 81003 URINALYSIS AUTO W/O SCOPE: CPT | Performed by: SURGERY

## 2022-10-31 NOTE — PROGRESS NOTES
Radha Garza,    I have reviewed your urine test results. Tests show no significant abnormalities (no urine infection). No changes to the plan as we had previously discussed. Please let me know if you have any questions.     Thanks,  Bipin Hartley MD

## 2023-08-14 ENCOUNTER — TELEPHONE (OUTPATIENT)
Dept: FAMILY MEDICINE CLINIC | Facility: CLINIC | Age: 53
End: 2023-08-14

## 2023-08-14 NOTE — TELEPHONE ENCOUNTER
Pt tested positive for Covid today. Symptoms started Saturday afternoon. Symptoms include Fever Tmax 101.7 (goes down after Tylenol), runny nose, body aches. Advised on home supportive cares - Tylenol/ibuprofen for pain, Mucinex DM 12-hour ER for cough, honey for sore throat, nasal spray and humidifier for congestion, increased rest and hydration, pt verbalized understanding. Advised on CDC home isolation guidelines:  Stay home for five days. If you have no symptoms or your symptoms are resolving after five days, you can leave your house. Continue to wear a mask around others for five additional days. If you have a fever, continue to stay home until your fever resolves. Advised to seek immediate emergency medical attention, if severe SOB, chest pain, dizziness, altered mental status, or severe fatigue. Is pt a candidate for Paxlovid? Routed to provider for review and advice.

## 2023-08-14 NOTE — TELEPHONE ENCOUNTER
Recommend supportive care, given mild sx and has no chronic conditions that put him at risk for severe Covid. Addressed while on wife;s video visit. Thanks.

## 2024-04-21 ENCOUNTER — HOSPITAL ENCOUNTER (EMERGENCY)
Facility: HOSPITAL | Age: 54
Discharge: HOME | End: 2024-04-21
Attending: EMERGENCY MEDICINE
Payer: COMMERCIAL

## 2024-04-21 VITALS
OXYGEN SATURATION: 96 % | RESPIRATION RATE: 19 BRPM | SYSTOLIC BLOOD PRESSURE: 112 MMHG | HEART RATE: 96 BPM | DIASTOLIC BLOOD PRESSURE: 70 MMHG | TEMPERATURE: 98 F

## 2024-04-21 DIAGNOSIS — I47.10 SVT (SUPRAVENTRICULAR TACHYCARDIA) (HCC): Primary | ICD-10-CM

## 2024-04-21 LAB
ALBUMIN SERPL-MCNC: 3.4 G/DL (ref 3.4–5)
ALBUMIN/GLOB SERPL: 0.9 {RATIO} (ref 1–2)
ALP LIVER SERPL-CCNC: 133 U/L
ALT SERPL-CCNC: 31 U/L
ANION GAP SERPL CALC-SCNC: 4 MMOL/L (ref 0–18)
AST SERPL-CCNC: 35 U/L (ref 15–37)
ATRIAL RATE: 101 BPM
BILIRUB SERPL-MCNC: 1.4 MG/DL (ref 0.1–2)
BUN BLD-MCNC: 12 MG/DL (ref 9–23)
CALCIUM BLD-MCNC: 8.9 MG/DL (ref 8.5–10.1)
CHLORIDE SERPL-SCNC: 108 MMOL/L (ref 98–112)
CO2 SERPL-SCNC: 29 MMOL/L (ref 21–32)
CREAT BLD-MCNC: 1.15 MG/DL
EGFRCR SERPLBLD CKD-EPI 2021: 77 ML/MIN/1.73M2 (ref 60–?)
GLOBULIN PLAS-MCNC: 4 G/DL (ref 2.8–4.4)
GLUCOSE BLD-MCNC: 122 MG/DL (ref 70–99)
MAGNESIUM SERPL-MCNC: 2.1 MG/DL (ref 1.6–2.6)
OSMOLALITY SERPL CALC.SUM OF ELEC: 293 MOSM/KG (ref 275–295)
P AXIS: 58 DEGREES
P-R INTERVAL: 152 MS
POTASSIUM SERPL-SCNC: 4.6 MMOL/L (ref 3.5–5.1)
PROT SERPL-MCNC: 7.4 G/DL (ref 6.4–8.2)
Q-T INTERVAL: 248 MS
Q-T INTERVAL: 322 MS
QRS DURATION: 90 MS
QRS DURATION: 92 MS
QTC CALCULATION (BEZET): 417 MS
QTC CALCULATION (BEZET): 449 MS
R AXIS: -25 DEGREES
R AXIS: -28 DEGREES
SODIUM SERPL-SCNC: 141 MMOL/L (ref 136–145)
T AXIS: 61 DEGREES
T AXIS: 74 DEGREES
VENTRICULAR RATE: 101 BPM
VENTRICULAR RATE: 197 BPM

## 2024-04-21 PROCEDURE — 96374 THER/PROPH/DIAG INJ IV PUSH: CPT

## 2024-04-21 PROCEDURE — 93005 ELECTROCARDIOGRAM TRACING: CPT

## 2024-04-21 PROCEDURE — 99284 EMERGENCY DEPT VISIT MOD MDM: CPT

## 2024-04-21 PROCEDURE — 93010 ELECTROCARDIOGRAM REPORT: CPT

## 2024-04-21 PROCEDURE — 99285 EMERGENCY DEPT VISIT HI MDM: CPT

## 2024-04-21 PROCEDURE — 83735 ASSAY OF MAGNESIUM: CPT | Performed by: EMERGENCY MEDICINE

## 2024-04-21 PROCEDURE — 80053 COMPREHEN METABOLIC PANEL: CPT | Performed by: EMERGENCY MEDICINE

## 2024-04-21 RX ORDER — ADENOSINE 3 MG/ML
INJECTION, SOLUTION INTRAVENOUS
Status: COMPLETED
Start: 2024-04-21 | End: 2024-04-21

## 2024-04-21 RX ORDER — DILTIAZEM HYDROCHLORIDE 120 MG/1
120 CAPSULE, COATED, EXTENDED RELEASE ORAL DAILY
Qty: 30 CAPSULE | Refills: 0 | Status: SHIPPED | OUTPATIENT
Start: 2024-04-21

## 2024-04-21 RX ORDER — ADENOSINE 3 MG/ML
6 INJECTION, SOLUTION INTRAVENOUS ONCE
Status: COMPLETED | OUTPATIENT
Start: 2024-04-21 | End: 2024-04-21

## 2024-04-21 NOTE — ED INITIAL ASSESSMENT (HPI)
Patient to the ER c/o heart palpitations that began at 1730. No chest pain. No daily medications.

## 2024-04-21 NOTE — ED PROVIDER NOTES
Patient Seen in: [facility] Emergency Department      History     Chief Complaint   Patient presents with    Arrythmia/Palpitations     Stated Complaint: palpitations    Subjective:   HPI    Patient is a 53-year-old male presents emergency room for evaluation palpitations.  Patient states he has had palpitations including racing heart since 5:30 PM.  Denies chest pain, shortness of breath, weakness, dizziness or lightheadedness.  He has never worn cardiac monitoring and denies history of cardiac arrhythmia.  Patient denies fever, vomiting or diarrhea.  No drug use.    Objective:   Past Medical History:    Calculus of kidney    Disorder of liver    Loss of appetite    Visual impairment    glasses    Wears glasses              Past Surgical History:   Procedure Laterality Date    Hernia surgery      Young age 7-10 yrs old    Other  08/2021    kidney stent placement    Other Right     broken leg repair-no hardware    Other surgical history  09/20/2021    CYSTOSCOPY, RIGHT URETEROSCOPIC STONE EXTRACTION, RIGHT RETROGRADE PYELOGRAM, RIGHT URETERAL STENT REMOVAL Dr. Rider                Social History     Socioeconomic History    Marital status:    Tobacco Use    Smoking status: Light Smoker     Current packs/day: 0.00     Types: Cigarettes    Smokeless tobacco: Never    Tobacco comments:     Cigar every once and a while.   Vaping Use    Vaping status: Never Used   Substance and Sexual Activity    Alcohol use: Not Currently     Alcohol/week: 0.0 standard drinks of alcohol     Comment: 8-10 shots approx/day    Drug use: Never   Other Topics Concern    Caffeine Concern No    Exercise No    Seat Belt Yes    Special Diet Yes     Comment: Low salt    Stress Concern No    Weight Concern No              Review of Systems    Positive for stated complaint: palpitations  Other systems are as noted in HPI.  Constitutional and vital signs reviewed.      All other systems reviewed and negative except as noted  above.    Physical Exam     ED Triage Vitals [04/21/24 0040]   /81   Pulse (!) 201   Resp 16   Temp 98 °F (36.7 °C)   Temp src Temporal   SpO2 98 %   O2 Device None (Room air)       Current:/70   Pulse 96   Temp 98 °F (36.7 °C) (Temporal)   Resp 19   SpO2 96%         Physical Exam    GENERAL: No acute distress, well appearing and non-toxic, Alert and oriented X 3   HEENT: Normocephalic, atraumatic.  Moist mucous membranes.  Pupils equal round reactive to light and accommodation, extraocular motion is intact, sclerae white, conjunctiva is pink.  Oropharynx is unremarkable, no exudate.  NECK: Supple, trachea midline, no lymphadenopathy.   LUNG: Lungs clear to auscultation bilaterally, no wheezing, no rales, no rhonchi.  CARDIOVASCULAR: Tachycardic.  Regular rate and rhythm.  Normal S1S2.  No S3S4 or murmur.  ABDOMEN: Bowel sounds are present. Soft. nondistended, no pulsatile masses. nontender  MUSCULOSKELETAL: No calf tenderness.  Dorsalis and Posterior Tibial pulses present. No clubbing. No cyanosis.  No edema.   SKIN EXAMINATIoN: Warm and dry with normal appearance.  No rashes or lesions.  NEUROLOGICAL:  Motor strength intact all groups.  normal sensation, speech intact    ED Course     Labs Reviewed   COMP METABOLIC PANEL (14) - Abnormal; Notable for the following components:       Result Value    Glucose 122 (*)     Alkaline Phosphatase 133 (*)     A/G Ratio 0.9 (*)     All other components within normal limits   MAGNESIUM - Normal   RAINBOW DRAW LAVENDER   RAINBOW DRAW LIGHT GREEN   RAINBOW DRAW BLUE     EKG    Rate, intervals and axes as noted on EKG Report.  Rate: 197  Rhythm: Supraventricular tachycardia  Reading: SVT         EKG #2 EKG  Rate, Axis and intervals as noted.  I agree with computer interpretation.  Rate: 101  Rhythm: Sinus rhythm  Reading: No acute changes        Medications   adenosine (Adenocard) 6 mg/2mL injection 6 mg (6 mg Intravenous Given 4/21/24 0049)              MDM       Patient is a 53-year-old male presents emergency room for evaluation of palpitations.  Differential clues cardiac arrhythmia, SVT, electrolyte abnormality.  EKG consistent with SVT.  Patient given 6 mg of adenosine with conversion of SVT now normal sinus rhythm.  Electrolytes and magnesium unremarkable.  Patient had no chest pain or shortness of breath.  Patient is going to be discharged home on Cardizem 120 mg daily.  Recommend follow-up with cardiology.  Return if further palpitations.  Critical care time was 35 minutes. This critical care time is exclusive of procedures critical care time includes monitoring of patient's cardiopulmonary and hemodynamic status, interpretation of laboratory values, and discussion of case with physician and consultants.                                   Medical Decision Making      Disposition and Plan     Clinical Impression:  1. SVT (supraventricular tachycardia) (HCC)         Disposition:  Discharge  4/21/2024  1:20 am    Follow-up:  Laith Love MD  [address] [place] [id number]  [phone]    Follow up in 1 week(s)            Medications Prescribed:  Discharge Medication List as of 4/21/2024  1:48 AM        START taking these medications    Details   dilTIAZem ER (CARDIZEM CD) 120 MG Oral Capsule SR 24 Hr Take 1 capsule (120 mg total) by mouth daily., Normal, Disp-30 capsule, R-0

## 2024-04-25 ENCOUNTER — OFFICE VISIT (OUTPATIENT)
Dept: FAMILY MEDICINE CLINIC | Facility: CLINIC | Age: 54
End: 2024-04-25
Payer: COMMERCIAL

## 2024-04-25 VITALS
RESPIRATION RATE: 18 BRPM | BODY MASS INDEX: 23 KG/M2 | TEMPERATURE: 98 F | OXYGEN SATURATION: 98 % | HEART RATE: 76 BPM | DIASTOLIC BLOOD PRESSURE: 62 MMHG | WEIGHT: 168 LBS | SYSTOLIC BLOOD PRESSURE: 118 MMHG

## 2024-04-25 DIAGNOSIS — Z00.00 ANNUAL PHYSICAL EXAM: Primary | ICD-10-CM

## 2024-04-25 DIAGNOSIS — Z13.6 SCREENING FOR CARDIOVASCULAR CONDITION: ICD-10-CM

## 2024-04-25 DIAGNOSIS — Z13.0 SCREENING FOR ENDOCRINE, METABOLIC, AND IMMUNITY DISORDER: ICD-10-CM

## 2024-04-25 DIAGNOSIS — Z13.228 SCREENING FOR ENDOCRINE, METABOLIC, AND IMMUNITY DISORDER: ICD-10-CM

## 2024-04-25 DIAGNOSIS — Z12.5 PROSTATE CANCER SCREENING: ICD-10-CM

## 2024-04-25 DIAGNOSIS — Z12.11 COLON CANCER SCREENING: ICD-10-CM

## 2024-04-25 DIAGNOSIS — Z13.29 SCREENING FOR ENDOCRINE, METABOLIC, AND IMMUNITY DISORDER: ICD-10-CM

## 2024-04-25 DIAGNOSIS — K80.20 CALCULUS OF GALLBLADDER WITHOUT CHOLECYSTITIS WITHOUT OBSTRUCTION: ICD-10-CM

## 2024-04-25 DIAGNOSIS — L21.9 SEBORRHEIC DERMATITIS: ICD-10-CM

## 2024-04-25 DIAGNOSIS — I47.10 SVT (SUPRAVENTRICULAR TACHYCARDIA) (HCC): ICD-10-CM

## 2024-04-25 DIAGNOSIS — F17.200 TOBACCO DEPENDENCE: ICD-10-CM

## 2024-04-25 PROCEDURE — 96127 BRIEF EMOTIONAL/BEHAV ASSMT: CPT | Performed by: FAMILY MEDICINE

## 2024-04-25 PROCEDURE — 99396 PREV VISIT EST AGE 40-64: CPT | Performed by: FAMILY MEDICINE

## 2024-04-25 RX ORDER — KETOCONAZOLE 20 MG/ML
1 SHAMPOO TOPICAL
Qty: 100 ML | Refills: 2 | Status: SHIPPED | OUTPATIENT
Start: 2024-04-25

## 2024-04-28 PROBLEM — K80.20 CALCULUS OF GALLBLADDER WITHOUT CHOLECYSTITIS WITHOUT OBSTRUCTION: Status: ACTIVE | Noted: 2024-04-28

## 2024-04-28 PROBLEM — I47.10 SVT (SUPRAVENTRICULAR TACHYCARDIA) (HCC): Status: ACTIVE | Noted: 2024-04-28

## 2024-04-28 PROBLEM — L21.9 SEBORRHEIC DERMATITIS: Status: ACTIVE | Noted: 2024-04-28

## 2024-04-28 PROBLEM — F17.200 TOBACCO DEPENDENCE: Status: ACTIVE | Noted: 2024-04-28

## 2024-04-29 NOTE — PROGRESS NOTES
Chief Complaint   Patient presents with    Follow - Up     Kidney liver    Referral     cardio       HPI:   Mason Cuevas is a 53 year old male who presents for a complete physical exam.   Patient is present for complete physical. Feels very well. Not Up to date with dental visits.No hearing problems. Vaccinations: declines Shingles and Tdap.    Annual physical:  Overall, pt states he feels well. He  for [employer] at [employer]    Sexually active: mongoamous with wife  Last PSA: overdue, ordered today  Last colonoscopy: overdue, amenable to Cologuard    Exercise: active lifestyle  Diet: regular    Scalp problem: pt reports having a scaly and dandruff problem with his scalp.  Has been a problem for years. Sides of his head a mostly affected, as well as his eyebrows and sides of face.    SVT: Pt was having palpitations, noticed when at home and waling downstairs, was not doing anything strenuous.  He went to the ED and was noted to be in SVT.  He was treated with Adenosine and discharged to home on Diltiazem. Today he feels well, has no dizziness, no CP, no SOB, no palpitations.     Gallstones: pt was noted on 2022 CT scan of abdomen to have cholelithiasis without acute cholecystitis. He currently has no pain in his RUQ after meals.        Wt Readings from Last 3 Encounters:   04/25/24 168 lb (76.2 kg)   09/26/22 153 lb 9.6 oz (69.7 kg)   10/19/21 154 lb (69.9 kg)      BP Readings from Last 3 Encounters:   04/25/24 118/62   04/21/24 112/70   09/26/22 126/50       AST (U/L)   Date Value   04/21/2024 35   10/01/2021 44 (H)   09/18/2021 55 (H)     ALT (U/L)   Date Value   04/21/2024 31   10/01/2021 42   09/18/2021 54      Current Outpatient Medications   Medication Sig Dispense Refill    ketoconazole 2 % External Shampoo Apply 1 Application topically twice a week. 100 mL 2    dilTIAZem ER (CARDIZEM CD) 120 MG Oral Capsule SR 24 Hr Take 1 capsule (120 mg total) by mouth daily. 30 capsule 0    Na  Sulfate-K Sulfate-Mg Sulf (SUPREP BOWEL PREP KIT) 17.5-3.13-1.6 GM/177ML Oral Solution Take as directed by physician. (Patient not taking: Reported on 9/26/2022) 354 mL 0    thiamine 100 MG Oral Tab Take 1 tablet (100 mg total) by mouth daily. (Patient not taking: Reported on 9/26/2022) 30 tablet 0    furosemide 20 MG Oral Tab Take 1 tablet (20 mg total) by mouth daily. (Patient not taking: Reported on 10/27/2022) 30 tablet 0    spironolactone 50 MG Oral Tab Take 1 tablet (50 mg total) by mouth daily. (Patient not taking: Reported on 9/26/2022) 30 tablet 0      Past Medical History:    Calculus of kidney    Disorder of liver    Loss of appetite    Visual impairment    glasses    Wears glasses      Past Surgical History:   Procedure Laterality Date    Hernia surgery      Young age 7-10 yrs old    Other  08/2021    kidney stent placement    Other Right     broken leg repair-no hardware    Other surgical history  09/20/2021    CYSTOSCOPY, RIGHT URETEROSCOPIC STONE EXTRACTION, RIGHT RETROGRADE PYELOGRAM, RIGHT URETERAL STENT REMOVAL Dr. Rider      Family History   Problem Relation Age of Onset    Musculo-skelatal Disorder Mother         Parkinsons    No Known Problems Father     No Known Problems Brother     Stroke Paternal Grandmother     Stroke Paternal Grandfather     No Known Problems Son     Cancer Maternal Aunt     Colon Cancer Neg     Prostate Cancer Neg       Social History     Socioeconomic History    Marital status:    Tobacco Use    Smoking status: Light Smoker     Current packs/day: 0.00     Types: Cigarettes    Smokeless tobacco: Never    Tobacco comments:     Cigar every once and a while.   Vaping Use    Vaping status: Never Used   Substance and Sexual Activity    Alcohol use: Not Currently     Alcohol/week: 0.0 standard drinks of alcohol     Comment: 8-10 shots approx/day    Drug use: Never   Other Topics Concern    Caffeine Concern No    Exercise No    Seat Belt Yes    Special Diet Yes     Comment:  Low salt    Stress Concern No    Weight Concern No     Exercise:  active lifestyle .  Diet:  regular   Allergies:  No Known Allergies     REVIEW OF SYSTEMS:     Review of Systems   Constitutional:  Negative for appetite change, fatigue and unexpected weight change.   Cardiovascular:  Negative for chest pain, palpitations and leg swelling.   Gastrointestinal:  Negative for abdominal pain, blood in stool, constipation, diarrhea, nausea and vomiting.   Genitourinary:  Negative for dysuria.   Skin:  Negative for rash.        Scaly and dandruff of scalp, eyebrows, sides of face   Neurological:  Negative for dizziness.        EXAM:   /62 (BP Location: Left arm, Patient Position: Sitting, Cuff Size: adult)   Pulse 76   Temp 98.4 °F (36.9 °C) (Temporal)   Resp 18   Wt 168 lb (76.2 kg)   SpO2 98%   BMI 23.43 kg/m²   GENERAL: WD/WN in no acute distress.   HEENT: PERRLA and EOMI.  OP moist no lesions. TM normal, canals normal.  NECK: is supple,thyroid- normal.  LUNGS: are clear to auscultation bilaterally, with no wheeze, rhonchi, or rales.  HEART: is RRR.  S1, S2, with no murmurs.    ABDOMEN: is soft, NT/ND with no HSM.  No rebound or guarding, NABS.     EXAM: deferred  RECTAL EXAM: deferred  EXTREMITIES: are symmetric with no cyanosis, clubbing, or edema.    SKIN: is unremarkable without rashes.    NEURO: no focal abnormalities, and reflexes coordination and gait normal and symmetric.   MUSK/SKEL: Normal muscles tones, no joint abnormalities, full ROM of all extremities.    back- normal curves, no scoliosis, normal gait,  No joint or muscle abnormalities.  PSYCH: pt is alert, oriented x 3, normal affect.    ASSESSMENT AND PLAN:   Mason Cuevas is a 53 year old male who presents for a complete physical exam.     1. Annual physical exam  Routine labs ordered today, await results. Counseled pt on healthy lifestyle changes. Vaccines today: declines Tdap and Shingles  .     Last PSA: overdue, ordered today  Last  colonoscopy: overdue, amenable to Cologuard    - CBC [6399] [Q]  - TSH W REFLEX TO FREE T4 [58985][Q]  - LIPID PANEL [7600] [Q]    2. Seborrheic dermatitis  - START Ketoconazole shampoo, R/B/SE of new med d/w pt  - if sx worsen or persist, advised to f-u    - ketoconazole 2 % External Shampoo; Apply 1 Application topically twice a week.  Dispense: 100 mL; Refill: 2    3. SVT (supraventricular tachycardia) (HCC)  - dx'd with SVT at 4/21/24 ED visit  - now on Diltiazem  - referral to Cardiology    - Cardio Referral - Internal  - TSH W REFLEX TO FREE T4 [97543][Q]    4. Calculus of gallbladder without cholecystitis without obstruction  - asymptomatic  - recommend low fat diet  - if develop sx, then will refer to Gen Surg    5. Screening for endocrine, metabolic, and immunity disorder    - CBC [6399] [Q]  - TSH W REFLEX TO FREE T4 [27959][Q]    6. Screening for cardiovascular condition    - LIPID PANEL [7600] [Q]    7. Prostate cancer screening    - PSA - Total [5363][Q]    8. Tobacco dependence  - smoking cessation d/w pt  - not ready to quit    9. Colon cancer screening    - COLOGUARD COLON CANCER SCREENING (EXTERNAL)      Pt's weight is Body mass index is 23.43 kg/m²., recommended low fat diet and aerobic exercise 30 minutes three times weekly.   The patient indicates understanding of these issues and agrees to the plan.    Return in about 1 year (around 4/25/2025), or if symptoms worsen or fail to improve, for annual physical.

## 2024-05-18 LAB
ABSOLUTE BASOPHILS: 53 CELLS/UL (ref 0–200)
ABSOLUTE EOSINOPHILS: 313 CELLS/UL (ref 15–500)
ABSOLUTE LYMPHOCYTES: 1776 CELLS/UL (ref 850–3900)
ABSOLUTE MONOCYTES: 371 CELLS/UL (ref 200–950)
ABSOLUTE NEUTROPHILS: 2788 CELLS/UL (ref 1500–7800)
BASOPHILS: 1 %
CHOL/HDLC RATIO: 2.5 (CALC)
CHOLESTEROL, TOTAL: 159 MG/DL
EOSINOPHILS: 5.9 %
HDL CHOLESTEROL: 63 MG/DL
HEMATOCRIT: 45.2 % (ref 38.5–50)
HEMOGLOBIN: 15.5 G/DL (ref 13.2–17.1)
LDL-CHOLESTEROL: 82 MG/DL (CALC)
LYMPHOCYTES: 33.5 %
MCH: 34.2 PG (ref 27–33)
MCHC: 34.3 G/DL (ref 32–36)
MCV: 99.8 FL (ref 80–100)
MONOCYTES: 7 %
MPV: 10.8 FL (ref 7.5–12.5)
NEUTROPHILS: 52.6 %
NON-HDL CHOLESTEROL: 96 MG/DL (CALC)
PLATELET COUNT: 99 THOUSAND/UL (ref 140–400)
PSA, TOTAL: 0.23 NG/ML
RDW: 13.6 % (ref 11–15)
RED BLOOD CELL COUNT: 4.53 MILLION/UL (ref 4.2–5.8)
TRIGLYCERIDES: 63 MG/DL
TSH W/REFLEX TO FT4: 2.96 MIU/L (ref 0.4–4.5)
WHITE BLOOD CELL COUNT: 5.3 THOUSAND/UL (ref 3.8–10.8)

## 2024-05-23 DIAGNOSIS — D69.6 THROMBOCYTOPENIA (HCC): Primary | ICD-10-CM

## (undated) DEVICE — CYSTO CDS-LF: Brand: MEDLINE INDUSTRIES, INC.

## (undated) DEVICE — TIGERTAIL 5F FLXTIP 70CM

## (undated) DEVICE — SCD SLEEVE KNEE HI BLEND

## (undated) DEVICE — SOL  .9 3000ML

## (undated) DEVICE — ZIPWIRE GUIDEWIRE .038X150 STR

## (undated) DEVICE — STERILE POLYISOPRENE POWDER-FREE SURGICAL GLOVES: Brand: PROTEXIS

## (undated) DEVICE — NITINOL STONE RETRIEVAL BASKET: Brand: ZERO TIP

## (undated) NOTE — LETTER
Patient Name: Vinh Zeng CSN: [id number]  -Age / Sex: 1970-A: 49 y  male Medical Records: [id number]    ABNORMAL VALUES  Surgeon(s):  Irene Raymond MD  Anesthesia Type: General  Procedure Description: CYSTOSCOPY, RIGHT URETEROSCOPIC STONE EXTRACT

## (undated) NOTE — LETTER
Bere Neal [address]  [address] [facility]  Authorization for Surgical Operation and Procedure     Date:___________                                                                                                         Time:__________ occur: fever and allergic reactions, hemolytic reactions, transmission of diseases such as Hepatitis, AIDS and Cytomegalovirus (CMV) and fluid overload.   In the event that I wish to have an autologous transfusion of my own blood, or a directed donor transf applicable recovery period ends for purposes of reinstating the DNAR order.   10. Patients having a sterilization procedure: I understand that if the procedure is successful the results will be permanent and it will therefore be impossible for me to insemin medicine and do additional procedures as necessary. Some examples are: Starting or using an “IV” to give me medicine, fluids or blood during my procedure, and having a breathing tube placed to help me breathe when I’m asleep (intubation).  In the event that but potential complications include headache, bleeding, infection, seizure, irregular heart rhythms, and nerve injury.     I can change my mind about having anesthesia services at any time before I get the medicine.    ______________________________________